# Patient Record
(demographics unavailable — no encounter records)

---

## 2025-07-24 NOTE — HISTORY OF PRESENT ILLNESS
[Radiating] : radiating [7] : 7 [de-identified] : Pt. is an 82 year old male who presents for evaluation of his LT 5th toe. Denies trauma. Reports pain for about 5 years. Had bone spur removed in [place] about 4 years ago. WB in [facility]. No recent treatment.  [] : no [FreeTextEntry1] : left foot [FreeTextEntry5] : bone spur removed 4 years ago by another dr in [place], pain still there no injury [FreeTextEntry6] : tender to touch, redness [FreeTextEntry7] : toe [FreeTextEntry9] : corn pad under toe [de-identified] : putting pressure on toe, certain shoes [de-identified] :  in [place] [de-identified] : in [place]

## 2025-07-24 NOTE — PHYSICAL EXAM
[NL (40)] : plantar flexion 40 degrees [NL 30)] : inversion 30 degrees [NL (20)] : eversion 20 degrees [5___] : eversion 5[unfilled]/5 [2+] : posterior tibialis pulse: 2+ [Normal] : saphenous nerve sensation normal [] : non-antalgic [Left] : left toe [Toe #: ____] : toe # [unfilled] [There are no fractures, subluxations or dislocations. No significant abnormalities are seen] : There are no fractures, subluxations or dislocations. No significant abnormalities are seen [FreeTextEntry3] : Callus plantar lateral aspect 5th toe.  [de-identified] :  AP, lateral and oblique xray views were taken and reviewed today.

## 2025-07-24 NOTE — DISCUSSION/SUMMARY
[de-identified] : With patient consent, callus debridement was performed today in office. Pt. tolerated this well.  WB in supportive footwear, wide footwear discussed. Pads/cushions discussed. Can do activities as tolerated.  Pumice stone discussed.  Follow up prn.